# Patient Record
Sex: FEMALE | Race: ASIAN | URBAN - METROPOLITAN AREA
[De-identification: names, ages, dates, MRNs, and addresses within clinical notes are randomized per-mention and may not be internally consistent; named-entity substitution may affect disease eponyms.]

---

## 2017-07-15 ENCOUNTER — TRANSFERRED RECORDS (OUTPATIENT)
Dept: HEALTH INFORMATION MANAGEMENT | Facility: CLINIC | Age: 32
End: 2017-07-15

## 2017-07-18 ENCOUNTER — HOSPITAL ENCOUNTER (OUTPATIENT)
Dept: OBGYN | Facility: CLINIC | Age: 32
Discharge: HOME | End: 2017-07-18
Attending: OBSTETRICS & GYNECOLOGY | Admitting: OBSTETRICS & GYNECOLOGY
Payer: COMMERCIAL

## 2017-07-18 PROCEDURE — 99215 OFFICE O/P EST HI 40 MIN: CPT

## 2017-07-18 NOTE — CONSULTS
LACTATION CONSULT/PHYSICIAN REPORT  [facility]       MOTHER    Doctor: Mimi     MOTHER'S CONCERNS: Latch problems and low milk yet. Postpartum care of lactating mother    Medical History: None    Pregnancy History/Breastfeeding History  G 1 P 1 No problems with this pregnancy Except a UTI in the 1st trimester    Delivery History      Labor Meds/Anesthesia  Epidural    Current Medications/Herbals  Prenatal vitamins and Ibuprofen    ASSESSMENT OF MOTHER    Physical: Perineal soreness    Milk Supply: Other: breasts are feeling very firm this morning, sore nipples    PUMPING: Pump in Style; pumped twice last night and this morning and got drops last night and this morning about 1 tsp    BABY: Vicky (boy)  Age: 3 days Birth Date: [birth year] Gestational Age: 38.5    Doctor: Roxana    Complications of Birth: None    Breastfeeding/hospital: Other: Fussy at breast so baby was given donor milk once    ASSESSMENT OF BABY    Physical: WNL     SUPPLEMENTATION: Formula, Amount: only last night and gave baby about 1/2 oz because he was so fussy    OUTPUT:  WNL 3 wet and 1 stool yesterday (dark green), today 2 wet and no stools.      BABY'S WEIGHT HISTORY    At Delivery:  Date: 7/15 Weight: 7-5    At Discharge:  Date:  Weight: 6-15    Age: 2 days  Date:  Weight: 6-11 + bili ck     Age: 3 days  Date:  Weight: 6-8 at Ped office   Age: 3 days  Date:  Weight: 6-6.7 here     FEEDING ASSESSMENT    BEFORE INTERVENTION: Pain Levels: L & R 5    AFTER INTERVENTION: Pain Levels: L &  R: both sides started with a little pain then down to 0    INTERVENTIONS/EVALUATION:  Cross Cradle, Asymmetric Latch, Flange lips and Breast Compression, lifted the breast and most of the pain resolved totally. Needed to renew these instructions as baby was moved to the other side    WEIGHT GAIN AT BREAST: (NOTE: 30cc = 1 oz):  At current weight, baby needs approximately 17.2 oz in 24 hours or 2.15 oz every 3 hours (65  cc)    Number order of breastfeeding    4 cc 1st side RIGHT breast after 10 minutes then spit 2 cc 4 cc 2nd side LEFT breast after 10 minutes   2 cc 3rd side RIGHT breast after 5 minutes   0 cc 4th side LEFT breast after 10 minutes    TOTAL: 10 cc or 1/3 oz after 35+ minutes    SUMMARY  Baby needed a little help with best latch and he latched and NW throughout the feeding. Position changes helped with the pain and at times he did more swallowing. Scale weights ac/pc are most likely not accurate since mom's milk is not fully in yet. Needed to use formula    RECOMMENDATIONS  1. Use the cross cradle hold and see Latch on 123 for best latch having nipple pointed up and bringing baby in quickly and tightly  2. Flange out the lower lip (dad to help)   3. Lift the breast  4. Breast compressions throughout the feeding. Squeeze while baby sucks, let up when baby pauses and repeat moving the thumb to another area. This will get more milk to baby and speed the feeding along faster  5. Switch nurse - both sides twice each feeding  6. Pump after each feeding for now until the milk is fully in and baby is gaining weight  7. For now offer 1 oz after each feeding. You can use dropper and finger feed or cup for now.   8. As soon as you get enough breast milk, stop the formula  9. See baby's weights to see if you would like to schedule another lactation visit.      Follow up: Patient to call lactation consultant if questions arise    Next visit/Phone call: Doctor: Schedule next visit after call about bili results      Lactation Consultant: Francisca Zayas RN  Date: 7/18/2017    Start: 1333    End: 1511    60 minute Lactation Consultation with > 50% of time spent on breastfeeding counseling and coordination of care.    [facility] LACTATION OFFICE  PHONE: [phone] FAX: [phone]

## 2018-03-18 ENCOUNTER — HOSPITAL ENCOUNTER (EMERGENCY)
Facility: CLINIC | Age: 33
Discharge: HOME | End: 2018-03-18
Attending: EMERGENCY MEDICINE | Admitting: EMERGENCY MEDICINE
Payer: COMMERCIAL

## 2018-03-18 VITALS
RESPIRATION RATE: 16 BRPM | SYSTOLIC BLOOD PRESSURE: 124 MMHG | BODY MASS INDEX: 27.06 KG/M2 | DIASTOLIC BLOOD PRESSURE: 54 MMHG | HEIGHT: 68 IN | OXYGEN SATURATION: 99 % | WEIGHT: 178.57 LBS | TEMPERATURE: 98.9 F | HEART RATE: 88 BPM

## 2018-03-18 DIAGNOSIS — R11.2 NAUSEA VOMITING AND DIARRHEA: ICD-10-CM

## 2018-03-18 DIAGNOSIS — K52.9 GASTROENTERITIS: ICD-10-CM

## 2018-03-18 DIAGNOSIS — R19.7 NAUSEA VOMITING AND DIARRHEA: ICD-10-CM

## 2018-03-18 LAB
ALBUMIN SERPL-MCNC: 3.9 G/DL (ref 3.4–5)
ALBUMIN UR-MCNC: NEGATIVE MG/DL
ALP SERPL-CCNC: 96 U/L (ref 40–150)
ALT SERPL W P-5'-P-CCNC: 26 U/L (ref 0–50)
ANION GAP SERPL CALCULATED.3IONS-SCNC: 6 MMOL/L (ref 3–14)
APPEARANCE UR: ABNORMAL
AST SERPL W P-5'-P-CCNC: 18 U/L (ref 0–45)
BASOPHILS # BLD AUTO: 0 10E9/L (ref 0–0.2)
BASOPHILS NFR BLD AUTO: 0.4 %
BILIRUB SERPL-MCNC: 0.5 MG/DL (ref 0.2–1.3)
BILIRUB UR QL STRIP: NEGATIVE
BUN SERPL-MCNC: 18 MG/DL (ref 7–30)
CALCIUM SERPL-MCNC: 8.7 MG/DL (ref 8.5–10.1)
CHLORIDE SERPL-SCNC: 107 MMOL/L (ref 94–109)
CO2 SERPL-SCNC: 27 MMOL/L (ref 20–32)
COLOR UR AUTO: YELLOW
CREAT SERPL-MCNC: 0.69 MG/DL (ref 0.52–1.04)
DIFFERENTIAL METHOD BLD: ABNORMAL
EOSINOPHIL # BLD AUTO: 0.1 10E9/L (ref 0–0.7)
EOSINOPHIL NFR BLD AUTO: 0.8 %
ERYTHROCYTE [DISTWIDTH] IN BLOOD BY AUTOMATED COUNT: 12.8 % (ref 10–15)
GFR SERPL CREATININE-BSD FRML MDRD: >90 ML/MIN/1.7M2
GLUCOSE SERPL-MCNC: 116 MG/DL (ref 70–99)
GLUCOSE UR STRIP-MCNC: NEGATIVE MG/DL
HCG UR QL: NEGATIVE
HCT VFR BLD AUTO: 42.8 % (ref 35–47)
HGB BLD-MCNC: 14.6 G/DL (ref 11.7–15.7)
HGB UR QL STRIP: NEGATIVE
IMM GRANULOCYTES # BLD: 0 10E9/L (ref 0–0.4)
IMM GRANULOCYTES NFR BLD: 0.4 %
KETONES UR STRIP-MCNC: 5 MG/DL
LEUKOCYTE ESTERASE UR QL STRIP: NEGATIVE
LIPASE SERPL-CCNC: 142 U/L (ref 73–393)
LYMPHOCYTES # BLD AUTO: 0.8 10E9/L (ref 0.8–5.3)
LYMPHOCYTES NFR BLD AUTO: 6.7 %
MCH RBC QN AUTO: 30.2 PG (ref 26.5–33)
MCHC RBC AUTO-ENTMCNC: 34.1 G/DL (ref 31.5–36.5)
MCV RBC AUTO: 89 FL (ref 78–100)
MONOCYTES # BLD AUTO: 0.4 10E9/L (ref 0–1.3)
MONOCYTES NFR BLD AUTO: 3.5 %
MUCOUS THREADS #/AREA URNS LPF: PRESENT /LPF
NEUTROPHILS # BLD AUTO: 10 10E9/L (ref 1.6–8.3)
NEUTROPHILS NFR BLD AUTO: 88.2 %
NITRATE UR QL: NEGATIVE
NRBC # BLD AUTO: 0 10*3/UL
NRBC BLD AUTO-RTO: 0 /100
PH UR STRIP: 6 PH (ref 5–7)
PLATELET # BLD AUTO: 259 10E9/L (ref 150–450)
POTASSIUM SERPL-SCNC: 3.6 MMOL/L (ref 3.4–5.3)
PROT SERPL-MCNC: 7.8 G/DL (ref 6.8–8.8)
RBC # BLD AUTO: 4.83 10E12/L (ref 3.8–5.2)
RBC #/AREA URNS AUTO: 2 /HPF (ref 0–2)
SODIUM SERPL-SCNC: 140 MMOL/L (ref 133–144)
SOURCE: ABNORMAL
SP GR UR STRIP: 1.03 (ref 1–1.03)
SQUAMOUS #/AREA URNS AUTO: 2 /HPF (ref 0–1)
UROBILINOGEN UR STRIP-MCNC: 0 MG/DL (ref 0–2)
WBC # BLD AUTO: 11.3 10E9/L (ref 4–11)
WBC #/AREA URNS AUTO: 2 /HPF (ref 0–5)

## 2018-03-18 PROCEDURE — 96361 HYDRATE IV INFUSION ADD-ON: CPT

## 2018-03-18 PROCEDURE — 80053 COMPREHEN METABOLIC PANEL: CPT | Performed by: EMERGENCY MEDICINE

## 2018-03-18 PROCEDURE — 85025 COMPLETE CBC W/AUTO DIFF WBC: CPT | Performed by: EMERGENCY MEDICINE

## 2018-03-18 PROCEDURE — 83690 ASSAY OF LIPASE: CPT | Performed by: EMERGENCY MEDICINE

## 2018-03-18 PROCEDURE — 96374 THER/PROPH/DIAG INJ IV PUSH: CPT

## 2018-03-18 PROCEDURE — 81001 URINALYSIS AUTO W/SCOPE: CPT | Performed by: EMERGENCY MEDICINE

## 2018-03-18 PROCEDURE — 99284 EMERGENCY DEPT VISIT MOD MDM: CPT | Mod: 25

## 2018-03-18 PROCEDURE — 25000128 H RX IP 250 OP 636: Performed by: EMERGENCY MEDICINE

## 2018-03-18 PROCEDURE — 81025 URINE PREGNANCY TEST: CPT | Performed by: EMERGENCY MEDICINE

## 2018-03-18 RX ORDER — ONDANSETRON 2 MG/ML
4 INJECTION INTRAMUSCULAR; INTRAVENOUS ONCE
Status: COMPLETED | OUTPATIENT
Start: 2018-03-18 | End: 2018-03-18

## 2018-03-18 RX ORDER — ONDANSETRON 4 MG/1
4 TABLET, ORALLY DISINTEGRATING ORAL EVERY 8 HOURS PRN
Qty: 10 TABLET | Refills: 0 | Status: SHIPPED | OUTPATIENT
Start: 2018-03-18

## 2018-03-18 RX ADMIN — ONDANSETRON 4 MG: 2 INJECTION INTRAMUSCULAR; INTRAVENOUS at 21:18

## 2018-03-18 RX ADMIN — SODIUM CHLORIDE 1000 ML: 9 INJECTION, SOLUTION INTRAVENOUS at 21:13

## 2018-03-18 ASSESSMENT — ENCOUNTER SYMPTOMS
DIARRHEA: 1
VOMITING: 1
BLOOD IN STOOL: 0
FEVER: 0
NAUSEA: 1
SHORTNESS OF BREATH: 0

## 2018-03-18 NOTE — ED AVS SNAPSHOT
[facility] Emergency Department    [address]    [facility] [address]    Phone:  [phone]    Fax:  [phone]                                       Pallavi Dhamal   MRN: [id number]    Department:  [facility] Emergency Department   Date of Visit:  3/18/2018           Patient Information     Date Of Birth          1985        Your diagnoses for this visit were:     Nausea vomiting and diarrhea     Gastroenteritis        You were seen by Jose Fuentes MD.      Follow-up Information     Schedule an appointment as soon as possible for a visit with [facility], [facility] [place], MD.    Specialty:  Family Practice    Why:  As needed    Contact information:    [address] [place] [address]  [phone]          Follow up with [facility] Emergency Department.    Specialty:  EMERGENCY MEDICINE    Why:  If symptoms worsen    Contact information:    [address]  [facility] [address]  [phone]        Discharge Instructions       Discharge Instructions  Gastroenteritis    You have been seen today for vomiting (throwing up) and diarrhea (loose stools), called gastroenteritis or the stomach flu. This is usually caused by a virus, but some bacteria, parasites, medicines or other medical conditions can cause similar symptoms. At this time your provider does not find that your vomiting and diarrhea is a sign of anything dangerous or life-threatening.  However, sometimes the signs of serious illness do not show up right away. Remember that serious problems like appendicitis can look like gastroenteritis at first.       Generally, every Emergency Department visit should have a follow-up clinic visit with either a primary or a specialty clinic/provider. Please follow-up as instructed by your emergency provider today.    Return to the Emergency Department if:    You keep vomiting and you are not able to keep liquids down.    You feel you are getting  dehydrated, such as being very thirsty, not urinating (peeing), or feeling faint or lightheaded.     You develop a new fever.    You have abdominal (belly) pain that seems worse than cramps, is in one spot, or is getting worse over time.     You have blood in your vomit or in your diarrhea.    You feel very weak.    What can I do to help myself?    The most important thing to do is to drink clear liquids.  If you have been vomiting a lot, it is best to have only small, frequent sips of liquids.  Drinking too much at once may cause more vomiting. Water is a good first option for rehydration. If you are vomiting often, you must also replace electrolytes (salts and minerals) lost with your illness. Pedialyte  is the best rehydration liquid but many don t like the taste so sports drinks (like Gatorade ) are a good option. Sodas and juice are also options but are high in sugar. Avoid acid liquids (orange), caffeine (coffee) or alcohol. Do not drink milk until you no longer have diarrhea.    After liquids are staying down, you may start eating mild foods. Soda crackers, toast, plain noodles, gelatin, applesauce and bananas are good first choices.  Avoid foods that have acid, are spicy, fatty or fibrous (such as meats, coarse grains, vegetables). You may start eating these foods again in about 3 days when you are better.    Sometimes treatment includes prescription medicine to prevent nausea (sick to your stomach) and vomiting and to prevent diarrhea. If your provider prescribes these for you, take them as directed.    Nonprescription medicine is available for the treatment of diarrhea and can be very effective.  If you use it, make sure you use the dose recommended on the package. Avoid Lomotil . Check with your healthcare provider before you use any medicine for diarrhea.    Do not take ibuprofen, or other nonsteroidal anti-inflammatory medicines without checking with your healthcare provider.  If you were given a  prescription for medicine here today, be sure to read all of the information (including the package insert) that comes with your prescription.  This will include important information about the medicine, its side effects, and any warnings that you need to know about.  The pharmacist who fills the prescription can provide more information and answer questions you may have about the medicine.  If you have questions or concerns that the pharmacist cannot address, please call or return to the Emergency Department.   Remember that you can always come back to the Emergency Department if you are not able to see your regular provider in the amount of time listed above, if you get any new symptoms, or if there is anything that worries you.      24 Hour Appointment Hotline       To make an appointment at any [facility], call [phone] ([phone]). If you don't have a family doctor or clinic, we will help you find one. [facility] are conveniently located to serve the needs of you and your family.             Review of your medicines      START taking        Dose / Directions Last dose taken    ondansetron 4 MG ODT tab   Commonly known as:  ZOFRAN ODT   Dose:  4 mg   Quantity:  10 tablet        Take 1 tablet (4 mg) by mouth every 8 hours as needed for nausea   Refills:  0                Prescriptions were sent or printed at these locations (1 Prescription)                   Other Prescriptions                Printed at Department/Unit printer (1 of 1)         ondansetron (ZOFRAN ODT) 4 MG ODT tab                Procedures and tests performed during your visit     CBC with platelets differential    Comprehensive metabolic panel    HCG qualitative urine (UPT)    Lipase    UA with Microscopic      Orders Needing Specimen Collection     None      Pending Results     No orders found from 3/16/2018 to 3/19/2018.            Pending Culture Results     No orders found from 3/16/2018 to 3/19/2018.            Pending  Results Instructions     If you had any lab results that were not finalized at the time of your Discharge, you can call the ED Lab Result RN at [phone]. You will be contacted by this team for any positive Lab results or changes in treatment. The nurses are available 7 days a week from 10A to 6:30P.  You can leave a message 24 hours per day and they will return your call.        Test Results From Your Hospital Stay        3/18/2018  9:30 PM      Component Results     Component Value Ref Range & Units Status    WBC 11.3 (H) 4.0 - 11.0 10e9/L Final    RBC Count 4.83 3.8 - 5.2 10e12/L Final    Hemoglobin 14.6 11.7 - 15.7 g/dL Final    Hematocrit 42.8 35.0 - 47.0 % Final    MCV 89 78 - 100 fl Final    MCH 30.2 26.5 - 33.0 pg Final    MCHC 34.1 31.5 - 36.5 g/dL Final    RDW 12.8 10.0 - 15.0 % Final    Platelet Count 259 150 - 450 10e9/L Final    Diff Method Automated Method  Final    % Neutrophils 88.2 % Final    % Lymphocytes 6.7 % Final    % Monocytes 3.5 % Final    % Eosinophils 0.8 % Final    % Basophils 0.4 % Final    % Immature Granulocytes 0.4 % Final    Nucleated RBCs 0 0 /100 Final    Absolute Neutrophil 10.0 (H) 1.6 - 8.3 10e9/L Final    Absolute Lymphocytes 0.8 0.8 - 5.3 10e9/L Final    Absolute Monocytes 0.4 0.0 - 1.3 10e9/L Final    Absolute Eosinophils 0.1 0.0 - 0.7 10e9/L Final    Absolute Basophils 0.0 0.0 - 0.2 10e9/L Final    Abs Immature Granulocytes 0.0 0 - 0.4 10e9/L Final    Absolute Nucleated RBC 0.0  Final         3/18/2018  9:45 PM      Component Results     Component Value Ref Range & Units Status    Sodium 140 133 - 144 mmol/L Final    Potassium 3.6 3.4 - 5.3 mmol/L Final    Chloride 107 94 - 109 mmol/L Final    Carbon Dioxide 27 20 - 32 mmol/L Final    Anion Gap 6 3 - 14 mmol/L Final    Glucose 116 (H) 70 - 99 mg/dL Final    Urea Nitrogen 18 7 - 30 mg/dL Final    Creatinine 0.69 0.52 - 1.04 mg/dL Final    GFR Estimate >90 >60 mL/min/1.7m2 Final    Non  GFR Calc    GFR  Estimate If Black >90 >60 mL/min/1.7m2 Final    African American GFR Calc    Calcium 8.7 8.5 - 10.1 mg/dL Final    Bilirubin Total 0.5 0.2 - 1.3 mg/dL Final    Albumin 3.9 3.4 - 5.0 g/dL Final    Protein Total 7.8 6.8 - 8.8 g/dL Final    Alkaline Phosphatase 96 40 - 150 U/L Final    ALT 26 0 - 50 U/L Final    AST 18 0 - 45 U/L Final         3/18/2018  9:45 PM      Component Results     Component Value Ref Range & Units Status    Lipase 142 73 - 393 U/L Final         3/18/2018  9:59 PM      Component Results     Component Value Ref Range & Units Status    Color Urine Yellow  Final    Appearance Urine Slightly Cloudy  Final    Glucose Urine Negative NEG^Negative mg/dL Final    Bilirubin Urine Negative NEG^Negative Final    Ketones Urine 5 (A) NEG^Negative mg/dL Final    Specific Gravity Urine 1.030 1.003 - 1.035 Final    Blood Urine Negative NEG^Negative Final    pH Urine 6.0 5.0 - 7.0 pH Final    Protein Albumin Urine Negative NEG^Negative mg/dL Final    Urobilinogen mg/dL 0.0 0.0 - 2.0 mg/dL Final    Nitrite Urine Negative NEG^Negative Final    Leukocyte Esterase Urine Negative NEG^Negative Final    Source Midstream Urine  Final    WBC Urine 2 0 - 5 /HPF Final    RBC Urine 2 0 - 2 /HPF Final    Squamous Epithelial /HPF Urine 2 (H) 0 - 1 /HPF Final    Mucous Urine Present (A) NEG^Negative /LPF Final         3/18/2018  9:59 PM      Component Results     Component Value Ref Range & Units Status    HCG Qual Urine Negative NEG^Negative Final    This test is for screening purposes.  Results should be interpreted along with   the clinical picture.  Confirmation testing is available if warranted by   ordering KIN566, HCG Quantitative Pregnancy.                  Clinical Quality Measure: Blood Pressure Screening     Your blood pressure was checked while you were in the emergency department today. The last reading we obtained was  BP: 124/54 . Please read the guidelines below about what these numbers mean and what you should  "do about them.  If your systolic blood pressure (the top number) is less than 120 and your diastolic blood pressure (the bottom number) is less than 80, then your blood pressure is normal. There is nothing more that you need to do about it.  If your systolic blood pressure (the top number) is 120-139 or your diastolic blood pressure (the bottom number) is 80-89, your blood pressure may be higher than it should be. You should have your blood pressure rechecked within a year by a primary care provider.  If your systolic blood pressure (the top number) is 140 or greater or your diastolic blood pressure (the bottom number) is 90 or greater, you may have high blood pressure. High blood pressure is treatable, but if left untreated over time it can put you at risk for heart attack, stroke, or kidney failure. You should have your blood pressure rechecked by a primary care provider within the next 4 weeks.  If your provider in the emergency department today gave you specific instructions to follow-up with your doctor or provider even sooner than that, you should follow that instruction and not wait for up to 4 weeks for your follow-up visit.        Thank you for choosing [facility]       Thank you for choosing [facility] for your care. Our goal is always to provide you with excellent care. Hearing back from our patients is one way we can continue to improve our services. Please take a few minutes to complete the written survey that you may receive in the mail after you visit with us. Thank you!        [facility] [employer] Information     CallmyName lets you send messages to your doctor, view your test results, renew your prescriptions, schedule appointments and more. To sign up, go to [email or web address] . Click on \"Log in\" on the left side of the screen, which will take you to the Welcome page. Then click on \"Sign up Now\" on the right side of the page.     You will be asked to enter the access code listed below, as well as some personal " information. Please follow the directions to create your username and password.     Your access code is: [email or web address]  Expires: 2018 10:23 PM     Your access code will  in 90 days. If you need help or a new code, please call your [facility] clinic or [phone].        Care EveryWhere ID     This is your Care EveryWhere ID. This could be used by other organizations to access your [facility] medical records  [id number]        Equal Access to Services     [facility] CenterAYDEE : Hadii kellee schreibero Sodianeali, waaxda luqadaha, qaybta kaalmada adephil, nessa evans . So [facility] [phone].    ATENCIÓN: Si habla español, tiene a mcclellan disposición servicios gratuitos de asistencia lingüística. Llame al [phone].    We comply with applicable federal civil rights laws and [place] laws. We do not discriminate on the basis of race, color, national origin, age, disability, sex, sexual orientation, or gender identity.            After Visit Summary       This is your record. Keep this with you and show to your community pharmacist(s) and doctor(s) at your next visit.

## 2018-03-19 NOTE — ED PROVIDER NOTES
"  History     Chief Complaint:  Nausea, Vomiting, & Diarrhea    HPI   Pallavi Dhamal is a 32 year old female who presents to the emergency department today for evaluation of nausea, vomiting, and diarrhea. The patient reports that earlier today she took her 8 month old child to the clinic for evaluation of the same symptoms around 1200 and at that time she had some mild abdominal discomfort. She states that around 1500 she was unable to keep anything in down with constant vomiting and diarrhea. She notes some low back pain which she attributes to breast feeding.  She denies any blood in stool, chest pain, shortness of breath, or fever. She denies any pregnancy.     Allergies:  Amoxicillin     Medications:    Zofran    Past Medical History:    Past medical history reviewed. No pertinent past medical history.     Past Surgical History:    Surgical history reviewed. No pertinent surgical history.     Family History:    History reviewed. No pertinent family history.     Social History:  Smoking Status: Never Smoker  Alcohol Use: Negative  Marital Status:       Review of Systems   Constitutional: Negative for fever.   Respiratory: Negative for shortness of breath.    Cardiovascular: Negative for chest pain.   Gastrointestinal: Positive for diarrhea, nausea and vomiting. Negative for blood in stool.   All other systems reviewed and are negative.    Physical Exam     Patient Vitals for the past 24 hrs:   BP Temp Temp src Pulse Resp SpO2 Height Weight   03/18/18 2215 124/54 - - - - 99 % - -   03/18/18 2200 107/50 - - - - 100 % - -   03/18/18 2114 122/79 - - - - 100 % - -   03/18/18 2047 123/85 98.9  F (37.2  C) Oral 88 16 100 % 1.727 m (5' 8\") 81 kg (178 lb 9.2 oz)     Physical Exam  General: Alert, appears well-developed and well-nourished. Cooperative.     In mild distress  HEENT:  Head:  Atraumatic  Ears:  External ears are normal  Mouth/Throat:  Oropharynx is without erythema or exudate and mucous membranes are " dry.   Eyes:   Conjunctivae normal and EOM are normal. No scleral icterus.    Pupils are equal, round, and reactive to light.   CV:  Normal rate, regular rhythm, normal heart sounds and radial pulses are 2+ and symmetric.  No murmur.  Resp:  Breath sounds are clear bilaterally    Non-labored, no retractions or accessory muscle use  GI:  Abdomen is soft, no distension, no tenderness. No rebound or guarding.  No CVA tenderness bilaterally  MS:  Normal range of motion. No edema.    Normal strength in all 4 extremities.     Back atraumatic.    No midline cervical, thoracic, or lumbar tenderness  Skin:  Warm and dry.  No rash or lesions noted.  Neuro:  Alert. Normal strength.  GCS: 15  Psych:  Normal mood and affect.  Lymph: No anterior or posterior cervical lymphadenopathy noted.    Emergency Department Course   Laboratory:  Laboratory findings were communicated with the patient who voiced understanding of the findings.    UA: Ketones 5 (A), Squamous Epithelial/HPF 2 (H), Mucous Present (A)  HCG Qualitative Urine: Negative  CBC: WBC 11.3 (H), HGB 14.6,   CMP: Glucose 116 (H) o/w WNL (Creatinine 0.69)  Lipase: 142    Interventions:  2113 NS 1000 ml IV  2118 Zofran 4 mg IV    Emergency Department Course:    2050 Nursing notes and vitals reviewed.    2057 I performed an exam of the patient as documented above.     2113 IV was inserted and blood was drawn for laboratory testing, results above.    2209 I personally reviewed the laboratory results with the patient and answered all related questions prior to discharge.    Impression & Plan      Medical Decision Making:  Pallavi Dhamal is a 32 year old female who presents for evaluation of nausea, vomiting and diarrhea with mild abdominal discomfort in a nonfocal abdominal exam. I considered a broad differential diagnosis for this patient including viral gastroenteritis, bacterial infection of the large intestine (salmonella, shigella, campylobacter, e coli, etc), bowel  obstruction, intra-abdominal infection such as colitis, food poisoning, cholecystitis, UTI, pyelonephritis, appendicitis, etc.  There are no signs of worrisome intra-abdominal pathologies detected during the visit today.  She has a completely benign abdominal exam without rebound, guarding, or marked tenderness to palpation.  Supportive outpatient management is therefore indicated.  Abdominal pain precautions are given for home.   No indication for stool studies at this time.  No indication for CT at this time.  She passed oral challenge here in ED. It was discussed to return to the ED for blood in stool, increasing pain, or fevers more than 102.   Feels much improved after interventions in ED.     Diagnosis:    ICD-10-CM    1. Nausea vomiting and diarrhea R11.2 CBC with platelets differential    R19.7 Comprehensive metabolic panel     Lipase     UA with Microscopic     HCG qualitative urine (UPT)   2. Gastroenteritis K52.9      Disposition:   The patient is discharged to home.    Discharge Medications:  Discharge Medication List as of 3/18/2018 10:24 PM      START taking these medications    Details   ondansetron (ZOFRAN ODT) 4 MG ODT tab Take 1 tablet (4 mg) by mouth every 8 hours as needed for nausea, Disp-10 tablet, R-0, Local Print           Scribe Disclosure:  I, Frieda Palumbo, am serving as a scribe at 9:36 PM on 3/18/2018 to document services personally performed by Jose Fuentes MD based on my observations and the provider's statements to me.    [facility] EMERGENCY DEPARTMENT       Jose Fuentes MD  03/18/18 7928

## 2019-09-20 ENCOUNTER — OFFICE VISIT (OUTPATIENT)
Dept: OBGYN | Facility: CLINIC | Age: 34
End: 2019-09-20
Payer: COMMERCIAL

## 2019-09-20 VITALS
DIASTOLIC BLOOD PRESSURE: 76 MMHG | WEIGHT: 160 LBS | HEART RATE: 60 BPM | HEIGHT: 69 IN | SYSTOLIC BLOOD PRESSURE: 120 MMHG | BODY MASS INDEX: 23.7 KG/M2

## 2019-09-20 DIAGNOSIS — Z31.9 PATIENT DESIRES PREGNANCY: Primary | ICD-10-CM

## 2019-09-20 PROCEDURE — 99203 OFFICE O/P NEW LOW 30 MIN: CPT | Performed by: OBSTETRICS & GYNECOLOGY

## 2019-09-20 RX ORDER — PRENATAL VIT/IRON FUM/FOLIC AC 27MG-0.8MG
1 TABLET ORAL DAILY
COMMUNITY

## 2019-09-20 ASSESSMENT — MIFFLIN-ST. JEOR: SCORE: 1477.2

## 2019-09-20 NOTE — PATIENT INSTRUCTIONS
Your evaluation for infertility involves tests for you and for your partner. These tests may include blood work timed for certain days of the menstrual cycle, a special x-ray to evaluate whether the fallopian tubes are open (HSG, or hysterosalpingogram), and possibly an ultrasound. Your partner may need to have a semen analysis done to check for normal numbers of sperm and whether the sperm have a normal shape and normal abilities to move. Not all patients will need all of these tests done, especially if you have had testing elsewhere before your visit to our clinic.    Call your clinic on the first business day after your period starts to schedule labs for the third or fourth day of your cycle (FSH, estradiol.) You may also need to schedule an HSG at this time, which will be done in the radiology department. You will need antibiotics if you are having an HSG, so please make sure you tell the nurse what pharmacy you use if you have not already received a prescription for those.    You may need other labs done on the 21st day of your cycle (or the closest business day to that date.) This is a progesterone test to document whether you are ovulating.    Tests for thyroid function and other hormone levels do not have to be done at a particular time and may be scheduled with either of the above timed tests (on day 3 or day 21).    Your partner should get in touch with his primary care provider to arrange a semen analysis. Please be sure that those results are forwarded to your OBGYN provider so that a notation may be made in your record as well.    You will usually have a follow up appointment after all tests are completed, to review your results and to plan what steps should be taken next. If at any point in the process you have questions or concerns, please contact your clinic.    [facility]  [phone]

## 2019-09-20 NOTE — PROGRESS NOTES
"Pallavi Dhamal was seen today for initial infertility consultation.   She is a 34 year old year old  who has been attempting conception for 7 month(s). Has one child, got pregnant \"right away\" with that pregnancy.  Tracking cycles--regular, with positive OPKs and BBT charting.    Prior pregnancy history is as follows:   OB History    Para Term  AB Living   1 1 1 0 0 1   SAB TAB Ectopic Multiple Live Births   0 0 0 0 0      # Outcome Date GA Lbr Leandro/2nd Weight Sex Delivery Anes PTL Lv   1 Term  38w0d    Vag-Spont         Obstetric Comments   38+ wk vaginal delivery @ [facility] in [place], [place].       HX:  Allergies   Allergen Reactions     Amoxicillin Rash     Past Medical History:   Diagnosis Date     NO ACTIVE PROBLEMS      Past Surgical History:   Procedure Laterality Date     NO HISTORY OF SURGERY       Social History     Socioeconomic History     Marital status:      Spouse name: Not on file     Number of children: Not on file     Years of education: Not on file     Highest education level: Not on file   Occupational History     Not on file   Social Needs     Financial resource strain: Not on file     Food insecurity:     Worry: Not on file     Inability: Not on file     Transportation needs:     Medical: Not on file     Non-medical: Not on file   Tobacco Use     Smoking status: Never Smoker     Smokeless tobacco: Never Used   Substance and Sexual Activity     Alcohol use: No     Drug use: No     Sexual activity: Yes     Partners: Male   Lifestyle     Physical activity:     Days per week: Not on file     Minutes per session: Not on file     Stress: Not on file   Relationships     Social connections:     Talks on phone: Not on file     Gets together: Not on file     Attends Yazidi service: Not on file     Active member of club or organization: Not on file     Attends meetings of clubs or organizations: Not on file     Relationship status: Not on file     Intimate partner " violence:     Fear of current or ex partner: Not on file     Emotionally abused: Not on file     Physically abused: Not on file     Forced sexual activity: Not on file   Other Topics Concern     Parent/sibling w/ CABG, MI or angioplasty before 65F 55M? Not Asked   Social History Narrative     Not on file     Family History   Problem Relation Age of Onset     Family History Negative Mother      Family History Negative Father      Current Outpatient Medications   Medication Sig Dispense Refill     Prenatal Vit-Fe Fumarate-FA (PRENATAL MULTIVITAMIN W/IRON) 27-0.8 MG tablet Take 1 tablet by mouth daily       ondansetron (ZOFRAN ODT) 4 MG ODT tab Take 1 tablet (4 mg) by mouth every 8 hours as needed for nausea (Patient not taking: Reported on 9/20/2019) 10 tablet 0       GYNECOLOGIC HISTORY    Periods are regular q 28-30 days, lasting 2 days.   Dysmenorrhea is mild.  Cyclic symptoms include  None, though she used to prior to her last pregnancy. Periods came back after 2 months postpartum.  for 1 year.   Additional symptoms include none  Using OPK kits and BBT charting, both are positive.    STD History: No STD history  PID History: No  Ovarian cysts: No  IUD use: No  Galactorrhea: No  KANDI exposure:No  Hirsuitism: No  Intolerance to hot or cold: No  Visual changes: No  Significant change in weight within last year: No  History of abnormal pap: No    Male factor:   Partner is 37 years old.   No history of trauma to the genitalia, medications, tobacco, drug use.  Prior children: yes, with this patient   Erectile dysfunction: No  Ejaculatory dysfunction: No  Family history of cystic fibrosis: No  Family history of mental retardation: No  Seen by urologist: No   Semen analysis: No      PAST INFERTILITY EVALUATION AND TREATMENT:  Patient's evaluation has included:  Ultrasound none  HSG and Laparoscopy: No    Hormonal evaluation has included:   None so far  Past infertility treatment included none.    EXAM:  Vitals: BP  "120/76   Pulse 60   Ht 1.74 m (5' 8.5\")   Wt 72.6 kg (160 lb)   LMP 09/12/2019 (Exact Date)   Breastfeeding? No   BMI 23.97 kg/m    BMI= Body mass index is 23.97 kg/m .    Gen: alert, oriented, in no distress.  Psych: normal mood and affect.  No other examination was necessary for today's appointment.    ASSESSMENT/ PLAN:    Desires pregnancy.    We discussed basics of conception, including regular ovulation and normal ovarian reserve, normal sperm count with normal motility and morphology, and no blockages in the uterus or fallopian tubes. We discussed the ways to evaluate each of these components of basic fertility. She was given written information about the timing of blood work, and arranging semen analysis for her partner if needed--we will hold off on this for now as they have been trying for only 7 months. We also discussed hysterosalpingogram, including ibuprofen prior to the procedure along with prophylactic antibiotics, but plan is to hold on this for now as well. Day 21 labs ordered. She will call when her next cycle starts to schedule day 3 labs.       Today I spent 30 minutes with the patient, of which 30 minutes was spent reviewing normal conception, causes of infertility, workup for causes of infertility, possible treatment options for infertility.      Deidre Ragsdale MD  "

## 2019-09-20 NOTE — NURSING NOTE
"Chief Complaint   Patient presents with     Consult       Initial /76   Pulse 60   Ht 1.74 m (5' 8.5\")   Wt 72.6 kg (160 lb)   LMP 2019 (Exact Date)   Breastfeeding? No   BMI 23.97 kg/m   Estimated body mass index is 23.97 kg/m  as calculated from the following:    Height as of this encounter: 1.74 m (5' 8.5\").    Weight as of this encounter: 72.6 kg (160 lb).  BP completed using cuff size: regular    Questioned patient about current smoking habits.  Pt. has never smoked.          The following HM Due: NONE      The following patient reported/Care Every where data was sent to:  P [employer]STRA[employer]ALITY [employer]ITIATIVES [18898]  Kaley Morley LPN               "

## 2019-10-02 DIAGNOSIS — Z31.9 PATIENT DESIRES PREGNANCY: ICD-10-CM

## 2019-10-02 PROCEDURE — 84443 ASSAY THYROID STIM HORMONE: CPT | Performed by: OBSTETRICS & GYNECOLOGY

## 2019-10-02 PROCEDURE — 84439 ASSAY OF FREE THYROXINE: CPT | Performed by: OBSTETRICS & GYNECOLOGY

## 2019-10-02 PROCEDURE — 84146 ASSAY OF PROLACTIN: CPT | Performed by: OBSTETRICS & GYNECOLOGY

## 2019-10-02 PROCEDURE — 36415 COLL VENOUS BLD VENIPUNCTURE: CPT | Performed by: OBSTETRICS & GYNECOLOGY

## 2019-10-02 PROCEDURE — 84144 ASSAY OF PROGESTERONE: CPT | Performed by: OBSTETRICS & GYNECOLOGY

## 2019-10-03 LAB
PROGEST SERPL-MCNC: 26.7 NG/ML
PROLACTIN SERPL-MCNC: 11 UG/L (ref 3–27)
T4 FREE SERPL-MCNC: 0.9 NG/DL (ref 0.76–1.46)
TSH SERPL DL<=0.005 MIU/L-ACNC: 4.66 MU/L (ref 0.4–4)

## 2019-10-07 DIAGNOSIS — Z31.9 PATIENT DESIRES PREGNANCY: Primary | ICD-10-CM

## 2019-10-30 ENCOUNTER — TELEPHONE (OUTPATIENT)
Dept: OBGYN | Facility: CLINIC | Age: 34
End: 2019-10-30

## 2019-10-30 DIAGNOSIS — O21.0 HYPEREMESIS GRAVIDARUM: Primary | ICD-10-CM

## 2019-10-30 RX ORDER — PYRIDOXINE HCL (VITAMIN B6) 25 MG
25 TABLET ORAL EVERY 8 HOURS PRN
Qty: 90 TABLET | Refills: 0 | Status: SHIPPED | OUTPATIENT
Start: 2019-10-30

## 2019-10-30 RX ORDER — ONDANSETRON 4 MG/1
4 TABLET, ORALLY DISINTEGRATING ORAL EVERY 8 HOURS PRN
Qty: 30 TABLET | Refills: 0 | Status: SHIPPED | OUTPATIENT
Start: 2019-10-30

## 2019-10-30 NOTE — TELEPHONE ENCOUNTER
Spoke with pt.  Message below reviewed and also sent my chart message.  RX's sent to pharmacy.  States already has permission to work from home.  Will call back if not able to do this.  Cristal Evans RN

## 2019-10-30 NOTE — TELEPHONE ENCOUNTER
10/30/2019    Patient called requesting a letter for her job concluding patient has extreme nausea from . Patient would like it to be a letter stating she may need to work from home for 2 weeks or excused altogether. Patient also stated if someone wanted to push up her appt date from 12/20/2019 she would do that as well.     Otherwise patient offered being able to have it picked up in clinic once letter is completed. Patient stated her nausea is to the point that she is at times bedridden or can't hold her food down. Please review and advise. Patient  Ph:[phone]      Jason Hines -Patient Representative

## 2019-10-30 NOTE — TELEPHONE ENCOUNTER
Pt calling into clinic  7 wks pregnant,  Nauseated all day, only vomited 2x  Feeling dizzy  Food doesn't sound or taste good,   Nausea worse when feels stomach empty    Suggested eating small meals q 2 hrs, eating prior to getting out of bed  Encouraged to stay hydrated,   Suggested Vit B6 and Unisom to help minimize nausea  Call if symptoms worsen or concerned about dehydration    Ngozi Gracia RN, BS  Clinical Nurse Triage.

## 2019-10-30 NOTE — TELEPHONE ENCOUNTER
Please call patient. OK to give note to work from home for 2 weeks. Give standard nausea and vomiting of pregnancy precautions below. Can send prescription for zofran 4 mg ODT one every 6 hours as needed if vit B6/unisom do not work, but she should ADD this not stop the other (at a minimum, keep taking B6 even if she can't do unisom other than nighttime due to sleepiness.) Thanks.    For nausea and vomiting in pregnancy:     Start by eating a little something before getting out of bed in the morning and continue with snacks every 2 hours throughout the day. Be sure to drink plenty of fluids, but not a lot at one time (water, lemonade, Gatorade, Sprite, Ginger Ale). It is helpful to take small sips of water, about a tablespoon at a time, every 10-15 minutes rather than drinking a lot at one time. Do not drink when eating, as this can make you more nauseated as well.    You may use pyridoxine (vitamin B6), 25 mg by mouth every 6-8 hours as needed with 1/2 of unisom 25 mg tablet taken once or twice daily. These items may be purchased over the counter. The unisom can make you sleepy, so start with 1/2 tab at night and add a morning dose if needed.    If your symptoms don't improve after 1 week of regular use of unisom and B6 please contact the clinic and we can try other options.

## 2020-03-02 ENCOUNTER — HEALTH MAINTENANCE LETTER (OUTPATIENT)
Age: 35
End: 2020-03-02

## 2020-12-20 ENCOUNTER — HEALTH MAINTENANCE LETTER (OUTPATIENT)
Age: 35
End: 2020-12-20

## 2021-04-24 ENCOUNTER — HEALTH MAINTENANCE LETTER (OUTPATIENT)
Age: 36
End: 2021-04-24

## 2021-10-03 ENCOUNTER — HEALTH MAINTENANCE LETTER (OUTPATIENT)
Age: 36
End: 2021-10-03

## 2022-05-15 ENCOUNTER — HEALTH MAINTENANCE LETTER (OUTPATIENT)
Age: 37
End: 2022-05-15

## 2022-09-10 ENCOUNTER — HEALTH MAINTENANCE LETTER (OUTPATIENT)
Age: 37
End: 2022-09-10

## 2023-06-03 ENCOUNTER — HEALTH MAINTENANCE LETTER (OUTPATIENT)
Age: 38
End: 2023-06-03